# Patient Record
Sex: MALE | Race: WHITE | NOT HISPANIC OR LATINO | URBAN - METROPOLITAN AREA
[De-identification: names, ages, dates, MRNs, and addresses within clinical notes are randomized per-mention and may not be internally consistent; named-entity substitution may affect disease eponyms.]

---

## 2024-05-24 ENCOUNTER — OFFICE VISIT (OUTPATIENT)
Dept: URBAN - METROPOLITAN AREA CLINIC 54 | Facility: CLINIC | Age: 79
End: 2024-05-24
Payer: MEDICARE

## 2024-05-24 ENCOUNTER — DASHBOARD ENCOUNTERS (OUTPATIENT)
Age: 79
End: 2024-05-24

## 2024-05-24 VITALS
WEIGHT: 228.6 LBS | HEIGHT: 71 IN | BODY MASS INDEX: 32 KG/M2 | SYSTOLIC BLOOD PRESSURE: 157 MMHG | HEART RATE: 64 BPM | TEMPERATURE: 97.7 F | DIASTOLIC BLOOD PRESSURE: 79 MMHG

## 2024-05-24 DIAGNOSIS — R10.13 DYSPEPSIA: ICD-10-CM

## 2024-05-24 DIAGNOSIS — K86.2 PANCREATIC CYST: ICD-10-CM

## 2024-05-24 DIAGNOSIS — Q61.3 POLYCYSTIC KIDNEY DISEASE: ICD-10-CM

## 2024-05-24 DIAGNOSIS — N18.9 CHRONIC KIDNEY DISEASE, UNSPECIFIED CKD STAGE: ICD-10-CM

## 2024-05-24 PROBLEM — 253883006: Status: ACTIVE | Noted: 2024-05-24

## 2024-05-24 PROBLEM — 709044004: Status: ACTIVE | Noted: 2024-05-24

## 2024-05-24 PROCEDURE — 99244 OFF/OP CNSLTJ NEW/EST MOD 40: CPT | Performed by: PHYSICIAN ASSISTANT

## 2024-05-24 PROCEDURE — 99204 OFFICE O/P NEW MOD 45 MIN: CPT | Performed by: PHYSICIAN ASSISTANT

## 2024-05-24 RX ORDER — HYDRALAZINE HYDROCHLORIDE 25 MG/1
1 TABLET WITH FOOD TABLET, FILM COATED ORAL THREE TIMES A DAY
Status: ACTIVE | COMMUNITY

## 2024-05-24 RX ORDER — LATANOPROST 50 UG/ML
1 DROP INTO AFFECTED EYE IN THE EVENING SOLUTION/ DROPS OPHTHALMIC ONCE A DAY
Status: ACTIVE | COMMUNITY

## 2024-05-24 RX ORDER — TRAZODONE HYDROCHLORIDE 50 MG/1
1 TABLET AT BEDTIME AS NEEDED TABLET ORAL ONCE A DAY
Status: ACTIVE | COMMUNITY

## 2024-05-24 RX ORDER — MONTELUKAST 10 MG/1
1 TABLET TABLET, FILM COATED ORAL ONCE A DAY
Status: ACTIVE | COMMUNITY

## 2024-05-24 RX ORDER — ONDANSETRON 4 MG/1
1 TABLET ON THE TONGUE AND ALLOW TO DISSOLVE TABLET, ORALLY DISINTEGRATING ORAL ONCE A DAY
Status: ACTIVE | COMMUNITY

## 2024-05-24 RX ORDER — MULTIVITAMIN
1 TABLET TABLET ORAL ONCE A DAY
Status: ACTIVE | COMMUNITY

## 2024-05-24 RX ORDER — OMEPRAZOLE 20 MG/1
1 CAPSULE 30 MINUTES BEFORE MORNING MEAL CAPSULE, DELAYED RELEASE ORAL ONCE A DAY
Qty: 30 | Refills: 2 | OUTPATIENT
Start: 2024-05-24

## 2024-05-24 RX ORDER — LOSARTAN POTASSIUM 50 MG/1
1 TABLET TABLET, FILM COATED ORAL ONCE A DAY
Status: ACTIVE | COMMUNITY

## 2024-05-24 RX ORDER — ATORVASTATIN CALCIUM 80 MG/1
1 TABLET TABLET, FILM COATED ORAL ONCE A DAY
Status: ACTIVE | COMMUNITY

## 2024-05-24 RX ORDER — SERTRALINE 50 MG/1
1 TABLET TABLET, FILM COATED ORAL ONCE A DAY
Status: ACTIVE | COMMUNITY

## 2024-05-24 RX ORDER — TAMSULOSIN HYDROCHLORIDE 0.4 MG/1
1 CAPSULE CAPSULE ORAL ONCE A DAY
Status: ACTIVE | COMMUNITY

## 2024-05-24 RX ORDER — METOPROLOL SUCCINATE 50 MG/1
1 TABLET TABLET, FILM COATED, EXTENDED RELEASE ORAL ONCE A DAY
Status: ACTIVE | COMMUNITY

## 2024-05-24 RX ORDER — SITAGLIPTIN 50 MG/1
AS DIRECTED TABLET, FILM COATED ORAL
Status: ACTIVE | COMMUNITY

## 2024-05-24 RX ORDER — OXYBUTYNIN CHLORIDE 10 MG/1
1 TABLET TABLET, EXTENDED RELEASE ORAL ONCE A DAY
Status: ACTIVE | COMMUNITY

## 2024-05-24 RX ORDER — FAMOTIDINE 20 MG/1
1 TABLET AT BEDTIME AS NEEDED TABLET, FILM COATED ORAL ONCE A DAY
Status: ACTIVE | COMMUNITY

## 2024-05-24 RX ORDER — ZINC GLUCONATE 50 MG
1 TABLET TABLET ORAL ONCE A DAY
Status: ACTIVE | COMMUNITY

## 2024-05-29 LAB — H PYLORI BREATH TEST: NOT DETECTED

## 2024-05-30 ENCOUNTER — TELEPHONE ENCOUNTER (OUTPATIENT)
Dept: URBAN - METROPOLITAN AREA CLINIC 54 | Facility: CLINIC | Age: 79
End: 2024-05-30

## 2024-06-04 ENCOUNTER — TELEPHONE ENCOUNTER (OUTPATIENT)
Dept: URBAN - METROPOLITAN AREA CLINIC 54 | Facility: CLINIC | Age: 79
End: 2024-06-04

## 2024-06-06 ENCOUNTER — TELEPHONE ENCOUNTER (OUTPATIENT)
Dept: URBAN - METROPOLITAN AREA CLINIC 54 | Facility: CLINIC | Age: 79
End: 2024-06-06

## 2024-06-17 ENCOUNTER — LAB OUTSIDE AN ENCOUNTER (OUTPATIENT)
Dept: URBAN - METROPOLITAN AREA CLINIC 54 | Facility: CLINIC | Age: 79
End: 2024-06-17

## 2024-06-17 ENCOUNTER — OFFICE VISIT (OUTPATIENT)
Dept: URBAN - METROPOLITAN AREA CLINIC 54 | Facility: CLINIC | Age: 79
End: 2024-06-17
Payer: MEDICARE

## 2024-06-17 VITALS
HEIGHT: 71 IN | TEMPERATURE: 97.9 F | BODY MASS INDEX: 32.48 KG/M2 | WEIGHT: 232 LBS | HEART RATE: 59 BPM | SYSTOLIC BLOOD PRESSURE: 118 MMHG | DIASTOLIC BLOOD PRESSURE: 58 MMHG

## 2024-06-17 DIAGNOSIS — K86.2 PANCREATIC CYST: ICD-10-CM

## 2024-06-17 DIAGNOSIS — K59.09 OTHER CONSTIPATION: ICD-10-CM

## 2024-06-17 DIAGNOSIS — N18.9 CHRONIC KIDNEY DISEASE, UNSPECIFIED CKD STAGE: ICD-10-CM

## 2024-06-17 DIAGNOSIS — Q61.3 POLYCYSTIC KIDNEY DISEASE: ICD-10-CM

## 2024-06-17 DIAGNOSIS — Z12.11 ENCOUNTER FOR SCREENING COLONOSCOPY: ICD-10-CM

## 2024-06-17 DIAGNOSIS — R10.13 DYSPEPSIA: ICD-10-CM

## 2024-06-17 PROCEDURE — 99214 OFFICE O/P EST MOD 30 MIN: CPT | Performed by: INTERNAL MEDICINE

## 2024-06-17 RX ORDER — ZINC GLUCONATE 50 MG
1 TABLET TABLET ORAL ONCE A DAY
Status: ACTIVE | COMMUNITY

## 2024-06-17 RX ORDER — LOSARTAN POTASSIUM 25 MG/1
1 TABLET TABLET, FILM COATED ORAL ONCE A DAY
Status: ACTIVE | COMMUNITY

## 2024-06-17 RX ORDER — DOCUSATE SODIUM 100 MG/1
1 CAPSULE AS NEEDED CAPSULE, LIQUID FILLED ORAL ONCE A DAY
Status: ACTIVE | COMMUNITY

## 2024-06-17 RX ORDER — TRAZODONE HYDROCHLORIDE 50 MG/1
1 TABLET AT BEDTIME AS NEEDED TABLET ORAL ONCE A DAY
Status: ACTIVE | COMMUNITY

## 2024-06-17 RX ORDER — SITAGLIPTIN 50 MG/1
AS DIRECTED TABLET, FILM COATED ORAL
Status: ACTIVE | COMMUNITY

## 2024-06-17 RX ORDER — BACLOFEN 5 MG/1
1 TABLET AS NEEDED TABLET ORAL ONCE A DAY
Status: ACTIVE | COMMUNITY

## 2024-06-17 RX ORDER — TAMSULOSIN HYDROCHLORIDE 0.4 MG/1
1 CAPSULE CAPSULE ORAL ONCE A DAY
Status: ACTIVE | COMMUNITY

## 2024-06-17 RX ORDER — ATORVASTATIN CALCIUM 40 MG/1
1 TABLET TABLET, FILM COATED ORAL ONCE A DAY
Status: ACTIVE | COMMUNITY

## 2024-06-17 RX ORDER — SERTRALINE 50 MG/1
1 TABLET TABLET, FILM COATED ORAL ONCE A DAY
Status: ACTIVE | COMMUNITY

## 2024-06-17 RX ORDER — MONTELUKAST 10 MG/1
1 TABLET TABLET, FILM COATED ORAL ONCE A DAY
Status: ACTIVE | COMMUNITY

## 2024-06-17 RX ORDER — MULTIVITAMIN
1 TABLET TABLET ORAL ONCE A DAY
Status: ACTIVE | COMMUNITY

## 2024-06-17 RX ORDER — METOPROLOL SUCCINATE 50 MG/1
1 TABLET TABLET, FILM COATED, EXTENDED RELEASE ORAL ONCE A DAY
Status: ACTIVE | COMMUNITY

## 2024-06-17 RX ORDER — BACITRACIN ZINC, NEOMYCIN SULFATE, POLYMYXIN B SULFATE 500; 3.5; 1 [IU]/G; MG/G; [IU]/G
2 TABLETS AT BEDTIME AS NEEDED OINTMENT TOPICAL ONCE A DAY
Status: ACTIVE | COMMUNITY

## 2024-06-17 RX ORDER — LATANOPROST 50 UG/ML
1 DROP INTO AFFECTED EYE IN THE EVENING SOLUTION/ DROPS OPHTHALMIC ONCE A DAY
Status: ACTIVE | COMMUNITY

## 2024-06-17 RX ORDER — FAMOTIDINE 20 MG/1
1 TABLET AT BEDTIME AS NEEDED TABLET, FILM COATED ORAL ONCE A DAY
Status: ACTIVE | COMMUNITY

## 2024-06-17 RX ORDER — OMEPRAZOLE 20 MG/1
1 CAPSULE 30 MINUTES BEFORE MORNING MEAL CAPSULE, DELAYED RELEASE ORAL ONCE A DAY
Qty: 30 | Refills: 2 | Status: ACTIVE | COMMUNITY
Start: 2024-05-24

## 2024-06-17 RX ORDER — OMEPRAZOLE 20 MG/1
1 CAPSULE 30 MINUTES BEFORE MORNING MEAL CAPSULE, DELAYED RELEASE ORAL ONCE A DAY
Qty: 30 | Refills: 2 | OUTPATIENT

## 2024-06-17 RX ORDER — OXYBUTYNIN CHLORIDE 10 MG/1
1 TABLET TABLET, EXTENDED RELEASE ORAL ONCE A DAY
Status: ACTIVE | COMMUNITY

## 2024-06-17 RX ORDER — HYDRALAZINE HYDROCHLORIDE 25 MG/1
1 TABLET WITH FOOD TABLET, FILM COATED ORAL THREE TIMES A DAY
Status: ACTIVE | COMMUNITY

## 2024-06-17 RX ORDER — BACILLUS COAGULANS/INULIN 1B-250 MG
AS DIRECTED CAPSULE ORAL
Status: ACTIVE | COMMUNITY

## 2024-06-17 RX ORDER — ONDANSETRON 4 MG/1
1 TABLET ON THE TONGUE AND ALLOW TO DISSOLVE TABLET, ORALLY DISINTEGRATING ORAL ONCE A DAY
Status: ACTIVE | COMMUNITY

## 2024-06-17 NOTE — HPI-TODAY'S VISIT:
Agapito Barker is a 79 year old male pt with PMH of HTN, HLD, DM, CKD III, who was referred to Dr. Aleman by Dr. Bryant for indigestion.  A copy of this document will be sent to the referring provider.  Patient reports long-term issues with belching that is worsened over the last 3 weeks after his knee replacement.  Denies drinking through a straw, eating quickly, CPAP machine.  He does admit to chewing gum frequently and his friend at bedside states "he is known for chewing gum."  He is on Pepcid daily for heartburn. Of note, patient was recently admitted to [facility] for leg swelling and SOB after knee replacement.  BLE venous duplex was negative for DVT.  VQ scan negative for PE.  Also, patient had an MRI abdomen w/ and w/o contrast in 1/2024 that showed innumerable bilateral renal cysts secondary to polycystic kidney disease without suspicious renal lesions. 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 Patient states his pancreatic cysts were noted 20 years ago and no one has been concerned. No previous biopsy.  Follow Up 6/17/24: Patient presents for routine follow up. He returns for eructations, intermittent constipation. CT on 6/6 showed diverticulosis and retained stool in colon. He was advised to take Mag citrate x 1. He is currently on a stool softener and improving. He responsds marginal improvement with Omeprazole and continues to have eructations. He also has bloating. He was negative for HP.

## 2024-06-26 ENCOUNTER — OFFICE VISIT (OUTPATIENT)
Dept: URBAN - METROPOLITAN AREA CLINIC 54 | Facility: CLINIC | Age: 79
End: 2024-06-26

## 2024-08-05 ENCOUNTER — OFFICE VISIT (OUTPATIENT)
Dept: URBAN - METROPOLITAN AREA CLINIC 54 | Facility: CLINIC | Age: 79
End: 2024-08-05

## 2024-08-08 ENCOUNTER — OFFICE VISIT (OUTPATIENT)
Dept: URBAN - METROPOLITAN AREA SURGERY CENTER 14 | Facility: SURGERY CENTER | Age: 79
End: 2024-08-08

## 2025-04-13 ENCOUNTER — ERX REFILL RESPONSE (OUTPATIENT)
Dept: URBAN - NONMETROPOLITAN AREA CLINIC 4 | Facility: CLINIC | Age: 80
End: 2025-04-13

## 2025-04-13 RX ORDER — OMEPRAZOLE 20 MG/1
1 CAPSULE 30 MINUTES BEFORE MORNING MEAL ORALLY ONCE A DAY 30 DAYS CAPSULE, DELAYED RELEASE ORAL
Qty: 30 CAPSULE | Refills: 2